# Patient Record
Sex: MALE | Race: WHITE | NOT HISPANIC OR LATINO | Employment: FULL TIME | URBAN - METROPOLITAN AREA
[De-identification: names, ages, dates, MRNs, and addresses within clinical notes are randomized per-mention and may not be internally consistent; named-entity substitution may affect disease eponyms.]

---

## 2017-01-18 ENCOUNTER — GENERIC CONVERSION - ENCOUNTER (OUTPATIENT)
Dept: OTHER | Facility: OTHER | Age: 62
End: 2017-01-18

## 2017-01-26 ENCOUNTER — GENERIC CONVERSION - ENCOUNTER (OUTPATIENT)
Dept: OTHER | Facility: OTHER | Age: 62
End: 2017-01-26

## 2017-02-14 ENCOUNTER — GENERIC CONVERSION - ENCOUNTER (OUTPATIENT)
Dept: OTHER | Facility: OTHER | Age: 62
End: 2017-02-14

## 2017-02-16 ENCOUNTER — GENERIC CONVERSION - ENCOUNTER (OUTPATIENT)
Dept: OTHER | Facility: OTHER | Age: 62
End: 2017-02-16

## 2017-02-20 ENCOUNTER — GENERIC CONVERSION - ENCOUNTER (OUTPATIENT)
Dept: OTHER | Facility: OTHER | Age: 62
End: 2017-02-20

## 2017-02-21 LAB
BUN SERPL-MCNC: 15 MG/DL (ref 8–27)
BUN/CREA RATIO (HISTORICAL): 11 (ref 10–22)
CALCIUM SERPL-MCNC: 9.7 MG/DL (ref 8.6–10.2)
CHLORIDE SERPL-SCNC: 94 MMOL/L (ref 96–106)
CO2 SERPL-SCNC: 25 MMOL/L (ref 18–29)
CREAT SERPL-MCNC: 1.4 MG/DL (ref 0.76–1.27)
EGFR AFRICAN AMERICAN (HISTORICAL): 62 ML/MIN/1.73
EGFR-AMERICAN CALC (HISTORICAL): 53 ML/MIN/1.73
GLUCOSE SERPL-MCNC: 233 MG/DL (ref 65–99)
POTASSIUM SERPL-SCNC: 5.1 MMOL/L (ref 3.5–5.2)
SODIUM SERPL-SCNC: 139 MMOL/L (ref 134–144)

## 2017-02-22 ENCOUNTER — GENERIC CONVERSION - ENCOUNTER (OUTPATIENT)
Dept: OTHER | Facility: OTHER | Age: 62
End: 2017-02-22

## 2017-02-24 ENCOUNTER — GENERIC CONVERSION - ENCOUNTER (OUTPATIENT)
Dept: OTHER | Facility: OTHER | Age: 62
End: 2017-02-24

## 2017-03-01 ENCOUNTER — GENERIC CONVERSION - ENCOUNTER (OUTPATIENT)
Dept: OTHER | Facility: OTHER | Age: 62
End: 2017-03-01

## 2017-03-09 ENCOUNTER — GENERIC CONVERSION - ENCOUNTER (OUTPATIENT)
Dept: OTHER | Facility: OTHER | Age: 62
End: 2017-03-09

## 2017-03-15 ENCOUNTER — GENERIC CONVERSION - ENCOUNTER (OUTPATIENT)
Dept: OTHER | Facility: OTHER | Age: 62
End: 2017-03-15

## 2017-03-16 ENCOUNTER — ALLSCRIPTS OFFICE VISIT (OUTPATIENT)
Dept: OTHER | Facility: OTHER | Age: 62
End: 2017-03-16

## 2017-04-10 ENCOUNTER — ALLSCRIPTS OFFICE VISIT (OUTPATIENT)
Dept: OTHER | Facility: OTHER | Age: 62
End: 2017-04-10

## 2017-04-11 ENCOUNTER — GENERIC CONVERSION - ENCOUNTER (OUTPATIENT)
Dept: OTHER | Facility: OTHER | Age: 62
End: 2017-04-11

## 2017-04-11 LAB — HBA1C MFR BLD HPLC: 8.8 %

## 2017-04-12 ENCOUNTER — GENERIC CONVERSION - ENCOUNTER (OUTPATIENT)
Dept: OTHER | Facility: OTHER | Age: 62
End: 2017-04-12

## 2017-08-22 ENCOUNTER — ALLSCRIPTS OFFICE VISIT (OUTPATIENT)
Dept: OTHER | Facility: OTHER | Age: 62
End: 2017-08-22

## 2017-08-23 ENCOUNTER — GENERIC CONVERSION - ENCOUNTER (OUTPATIENT)
Dept: OTHER | Facility: OTHER | Age: 62
End: 2017-08-23

## 2017-08-25 ENCOUNTER — GENERIC CONVERSION - ENCOUNTER (OUTPATIENT)
Dept: OTHER | Facility: OTHER | Age: 62
End: 2017-08-25

## 2017-09-21 ENCOUNTER — GENERIC CONVERSION - ENCOUNTER (OUTPATIENT)
Dept: OTHER | Facility: OTHER | Age: 62
End: 2017-09-21

## 2017-11-20 ENCOUNTER — ALLSCRIPTS OFFICE VISIT (OUTPATIENT)
Dept: OTHER | Facility: OTHER | Age: 62
End: 2017-11-20

## 2017-11-21 NOTE — PROGRESS NOTES
Assessment    1  Controlled diabetes mellitus type II without complication (891 27) (K08 6)  2  Malignant neoplasm of esophagus (150 9) (C15 9)  3  PEG tube malfunction (536 42) (K94 23)    Plan  PEG tube malfunction    · Start: Cephalexin 500 MG Oral Tablet (Cephalexin Monohydrate); TAKE 1 TABLET 3TIMES DAILY    Discussion/Summary    Will disc w/ GI1        1 Amended By: Laura Adler; Nov 20 2017 1:51 PM EST    Chief Complaint  discuss removal of feeding tube      History of Present Illness  HPI: pt not using PEG---wants to remove      Review of Systems   Constitutional: no fever or chills, feels well, no tiredness, no recent weight loss or weight gain  Cardiovascular: no complaints of slow or fast heart rate, no chest pain, no palpitations, no leg claudication or lower extremity edema  Respiratory: no complaints of shortness of breath, no wheezing or cough, no dyspnea on exertion, no orthopnea or PND  Gastrointestinal: as noted in HPI  Active Problems  1  Abdominal pain (789 00) (R10 9)  2  Ac cerebral infarction w/ ischemia (434 91) (I63 8)  3  Back pain (724 5) (M54 9)  4  Controlled diabetes mellitus type II without complication (186 21) (N51 9)  5  Encounter for PPD test (V74 1) (Z11 1)  6  Erectile dysfunction (607 84) (N52 9)  7  Herniated nucleus pulposus, L5-S1 (722 10) (M51 27)  8  Hyperlipidemia (272 4) (E78 5)  9  Malignant neoplasm of esophagus (150 9) (C15 9)  10  Muscle spasm (728 85) (M62 838)  11  Need for zoster vaccine (V04 89) (Z23)  12  PEG tube malfunction (536 42) (K94 23)  13  S/P spinal fusion (V45 4) (Z98 1)  14  Screening for colorectal cancer (V76 51) (Z12 11,Z12 12)  15  Sepsis (038 9,995 91) (A41 9)  16  Transient cerebral ischemic attack (435 9) (G45 9)    Past Medical History  1  Community acquired pneumonia (5) (J18 9)  2  History of hypertension (V12 59) (Z86 79)  Active Problems And Past Medical History Reviewed:    The active problems and past medical history were reviewed and updated today  Family History  Mother   1  Family history of Mother  At Age 80  Father   2  Family history of Father  At Age 80  Paternal Grandmother   3  Family history of Paternal Grandfather  At Age 66  3  Family history of Paternal [de-identified]  At Age 68  Maternal Grandfather   5  Family history of Paternal Grandfather  At Age 80  6  Family history of Paternal [de-identified]  At Age 80  Family History   7  Denied: Family history of substance abuse  8  No family history of mental disorder    Social History     · Being A Social Drinker   · Caffeine Use   · Current Every Day Smoker (305 1)   · Marital History - Currently     Current Meds  1  AmLODIPine Besylate 5 MG Oral Tablet; TAKE 1 TABLET DAILY  Requested for: 76Kfl7490; Last Rx:49Agp1988 Ordered  2  Aspirin 325 MG Oral Tablet; TAKE 1 TABLET TWICE DAILY; Therapy: (Recorded:07Fdq7030) to Recorded  3  Caverject 40 MCG Intracavernosal Solution Reconstituted; Therapy: 84QVC4847 to Recorded  4  Colace 100 MG Oral Capsule; Therapy: 91CCJ3369 to Recorded  5  Lantus 100 UNIT/ML Subcutaneous Solution; inject 55  units daily; Therapy: 40DDZ0251 to (Last Rx:58Xej3876)  Requested for: 70Flv5170 Ordered  6  Metamucil MultiHealth Fiber 63 % Oral Powder; Therapy: 68LMA6544 to Recorded  7  MetFORMIN HCl - 1000 MG Oral Tablet; TAKE 1 TABLET TWICE DAILY  Requested for: 70Jll7424; Last Rx:30Kdk9873 Ordered  8  MetFORMIN HCl - 500 MG Oral Tablet; TAKE TWO TABLETS (1,000mg total) TWICE DAILY; Therapy: 55SFV2067 to (AEZOGB:09ERO3555)  Requested for: 2017; Last Rx:2017; Status: ACTIVE - Retrospective By Protocol Authorization Ordered  9  Simvastatin 40 MG Oral Tablet; take one tablet by mouth one time daily  Requested for: 34Sbl5811; Last Rx:01Mhf4354 Ordered    Allergies  1   No Known Drug Allergies    Vitals   Recorded: 2017 12:56PM   Heart Rate 88   Systolic 341   Diastolic 90   Height 5 ft    Weight 162 lb BMI Calculated 31 64   BSA Calculated 1 71   O2 Saturation 98       Physical Exam   Constitutional  General appearance: No acute distress, well appearing and well nourished  Pulmonary  Auscultation of lungs: Clear to auscultation, equal breath sounds bilaterally, no wheezes, no rales, no rhonci  Cardiovascular  Auscultation of heart: Normal rate and rhythm, normal S1 and S2, without murmurs  Abdomen  Abdomen: Non-tender, no masses  -- PEG in place          Signatures   Electronically signed by : Dwayne Howard MD; Nov 20 2017  1:51PM EST                       (Author)

## 2017-11-22 ENCOUNTER — GENERIC CONVERSION - ENCOUNTER (OUTPATIENT)
Dept: OTHER | Facility: OTHER | Age: 62
End: 2017-11-22

## 2017-12-07 ENCOUNTER — ALLSCRIPTS OFFICE VISIT (OUTPATIENT)
Dept: OTHER | Facility: OTHER | Age: 62
End: 2017-12-07

## 2017-12-23 ENCOUNTER — GENERIC CONVERSION - ENCOUNTER (OUTPATIENT)
Dept: FAMILY MEDICINE CLINIC | Facility: CLINIC | Age: 62
End: 2017-12-23

## 2017-12-23 ENCOUNTER — GENERIC CONVERSION - ENCOUNTER (OUTPATIENT)
Dept: OTHER | Facility: OTHER | Age: 62
End: 2017-12-23

## 2017-12-24 ENCOUNTER — GENERIC CONVERSION - ENCOUNTER (OUTPATIENT)
Dept: FAMILY MEDICINE CLINIC | Facility: CLINIC | Age: 62
End: 2017-12-24

## 2018-01-09 NOTE — PROCEDURES
Procedures by Lis Howe MD at  6/30/2016  1:32 PM      Author:  Lis Howe MD Service:  Cardiology Author Type:  Physician     Filed:  6/30/2016  1:41 PM Date of Service:  6/30/2016  1:32 PM Status:  Signed     :  Lis Howe MD (Physician)              Procedure Note:    At the request of infectious disease and internal medicine, we were asked to perform a transesophageal echocardiogram   The patient was brought down to the [facility] and assessed by anesthesia  I then explained the risks and benefits of the procedure and what  we were looking for  I explained to him that the benefit of this procedure would determine the duration and perhaps type of antibiotics  The patient was reluctant to go through with the procedure and was apprehensive  At that point he proceeded to  explain how he felt that most professions were filled with 90% stupidity and that he wanted me to convince him that I was part of the 10% that wasn't  I explained to him only the nature of the procedure, and at that point he did not wish to go through  with it  Procedure was canceled  Valery MARINO    Jun 30 2016  1:40PM [facility] Standard Time

## 2018-01-12 NOTE — PROCEDURES
Procedures by Yudelka Layne RN at 7/1/2016   3:45 PM      Author:  Yudelka Layne RN Service:  Outpatient Author Type:  Registered Nurse     Filed:  7/1/2016  4:22 PM Date of Service:  7/1/2016  3:45 PM Status:  Signed     :  Yudelka Layne RN (Registered Nurse)         Procedure Orders:       1  Insert PICC line [id number] ordered by Pamela Bolaños MD at 07/01/16 1344                 Post-procedure Diagnoses:       1  Acute right-sided low back pain without sciatica [M54 5]                   Insert PICC line  Date/Time: 7/1/2016 4:12 PM  Performed by: Xavier Mary  Authorized by: Delma Ellsworth     Patient location:  Bedside  Other Assisting Provider:  No    Consent:     Consent obtained:  Verbal and written (Obtained by Dr Adarsh Ruiz)    Consent given by:  Patient and healthcare agent (Son)  Chapin protocol:     Procedure explained and questions answered to patient or proxy's satisfaction: yes      Relevant documents present and verified: yes      Test results available and properly labeled: yes       Imaging studies available: yes      Required blood products, implants, devices, and special equipment available: yes      Site/side marked: yes      Immediately prior to procedure, a time out was called: yes  Joao Mello RN @1566)      Patient identity confirmed:  Verbally with patient, arm band and hospital-assigned identification number  Pre-procedure details:     Hand hygiene: Hand hygiene performed prior to insertion      Sterile barrier technique: All elements of maximal sterile technique followed      Skin preparation:  ChloraPrep    Skin preparation agent: Skin preparation agent completely dried prior to procedure    Indications:     PICC line indications: vascular access    Anesthesia (see MAR for exact dosages):      Anesthesia method:  Local infiltration    Local anesthetic:  Lidocaine 1% w/o epi (1 ml)  Procedure details:     Location:  Basilic    Vessel type: vein      Laterality:  Right Approach: percutaneous technique used      Patient position:  Flat    Procedural supplies:  Double lumen    Catheter size:  5 Fr    Landmarks identified: yes      Ultrasound guidance: yes (Cale Sapiens 3CG)      Sterile ultrasound techniques: Sterile gel and sterile probe covers were used      Number of attempts:  1    Successful placement: yes      Vessel of catheter tip end:  CAJ as confirmed by Cale Sapiens 3CG US    Total catheter length (cm):  43    Catheter out on skin (cm):  0    Max flow rate:  999 ml/hr    Arm circumference:  32 5  Post-procedure details:     Post-procedure:  Dressing applied and securement device placed    Assessment:  Blood return through all ports and free fluid flow    Post-procedure complications: none      Patient tolerance of procedure: Tolerated well, no immediate complications  Comments:      Uncomplicated PICC insertion, no complaints offered                       Received for:Provider  EPIC   Jul 1 2016  4:22PM [facility] Standard Time

## 2018-01-12 NOTE — MISCELLANEOUS
Assessment    1  S/P spinal fusion (V45 4) (Z98 1)    Plan  S/P spinal fusion    · Follow-up PRN Evaluation and Treatment  Follow-up  Status: Complete  Done:  75KAU1375 10:50AM   Ordered; For: S/P spinal fusion; Ordered By: Jarrett Anthony Performed:  Due: 63Qzc6326    History of Present Illness  TCM Communication [facility]: The patient is being contacted for follow-up after hospitalization  [facility]  The date of admission: 2016, date of discharge: 2016  Diagnosis: SPINAL FUSION  He was discharged to home  Medications reviewed and updated today  Follow-up appointments with other specialists: Freida Weir  The patient is currently asymptomatic  Counseling was provided to the patient  Communication performed and completed by      Review of Systems  Complete-Male:   Constitutional: No fever or chills, feels well, no tiredness, no recent weight gain or weight loss  Musculoskeletal: as noted in HPI  Active Problems    1  Ac cerebral infarction w/ ischemia (434 91) (I63 8)   2  Back pain (724 5) (M54 9)   3  Diabetes Mellitus   4  Erectile dysfunction (607 84) (N52 9)   5  Herniated nucleus pulposus, L5-S1 (722 10) (M51 27)   6  Hyperlipidemia (272 4) (E78 5)   7  Muscle spasm (728 85) (M62 838)   8  Need for zoster vaccine (V04 89) (Z23)   9  Sepsis (038 9,995 91) (A41 9)   10  Transient cerebral ischemic attack (435 9) (G45 9)    Past Medical History    1  Community acquired pneumonia (5) (J18 9)   2  History of hypertension (V12 59) (Z86 79)    Family History  Mother    1  Family history of Mother  At Age 80  Father    2  Family history of Father  At Age 80  Paternal Grandmother    3  Family history of Paternal Grandfather  At Age 67   4  Family history of Paternal [de-identified]  At Age 68  Maternal Grandfather    5  Family history of Paternal Grandfather  At Age 80   7   Family history of Paternal [de-identified]  At Age 80    Social History    · Being A Social Drinker   · Caffeine Use   · Current Every Day Smoker (305 1)   · Marital History - Currently     Current Meds   1  Aspirin TABS; Therapy: (Recorded:13Jly5626) to Recorded   2  Edex 40 MCG Intracavernosal Kit; USE AS DIRECTED; Therapy: 48THO6753 to (Last Rx:76Qgg9881)  Requested for: 79Peq6071 Ordered   3  Lantus 100 UNIT/ML Subcutaneous Solution; INJECT 70 UNIT Daily; Therapy: 87ZMH7490 to (Last Rx:62Yim5478)  Requested for: 18Dvj2463 Ordered   4  MetFORMIN HCl - 500 MG Oral Tablet; TAKE 2 TABLETS TWICE DAILY  Requested for:   12Wwa0181; Last Rx:63Izp0977 Ordered   5  Norvasc 5 MG Oral Tablet; Therapy: (Recorded:46Lma8051) to Recorded   6  Simvastatin 40 MG Oral Tablet; take one tablet by mouth one time daily  Requested for:   90Sxo9086; Last Rx:90Lfa5563 Ordered    Allergies    1  No Known Drug Allergies    Physical Exam    Constitutional   General appearance: No acute distress, well appearing and well nourished  Pulmonary   Auscultation of lungs: Clear to auscultation, equal breath sounds bilaterally, no wheezes, no rales, no rhonci  Cardiovascular   Auscultation of heart: Normal rate and rhythm, normal S1 and S2, without murmurs  Musculoskeletal   Inspection/palpation of joints, bones, and muscles: Abnormal   spinal surg healing well          Signatures   Electronically signed by : Francisca Becerra MD; Aug 25 2016 10:47AM EST                       (Author)

## 2018-01-13 VITALS
BODY MASS INDEX: 23.64 KG/M2 | HEIGHT: 68 IN | DIASTOLIC BLOOD PRESSURE: 110 MMHG | OXYGEN SATURATION: 98 % | SYSTOLIC BLOOD PRESSURE: 170 MMHG | HEART RATE: 86 BPM | RESPIRATION RATE: 16 BRPM | WEIGHT: 156 LBS

## 2018-01-13 VITALS
HEART RATE: 86 BPM | BODY MASS INDEX: 20.31 KG/M2 | WEIGHT: 134 LBS | SYSTOLIC BLOOD PRESSURE: 142 MMHG | DIASTOLIC BLOOD PRESSURE: 82 MMHG | OXYGEN SATURATION: 96 % | HEIGHT: 68 IN

## 2018-01-13 VITALS
WEIGHT: 162 LBS | DIASTOLIC BLOOD PRESSURE: 90 MMHG | BODY MASS INDEX: 31.8 KG/M2 | HEART RATE: 88 BPM | SYSTOLIC BLOOD PRESSURE: 162 MMHG | HEIGHT: 60 IN | OXYGEN SATURATION: 98 %

## 2018-01-16 NOTE — RESULT NOTES
Verified Results  [facility]) BMP8+eGFR 73MJV1354 09:46AM Judge Kimball     Test Name Result Flag Reference   Glucose, Serum 233 mg/dL H 65-99   BUN 15 mg/dL  8-27   Creatinine, Serum 1 40 mg/dL H 0 76-1 27   eGFR If NonAfricn Am 53 mL/min/1 73 L >59   eGFR If Africn Am 62 mL/min/1 73  >59   BUN/Creatinine Ratio 11  10-22   Sodium, Serum 139 mmol/L  134-144   Potassium, Serum 5 1 mmol/L  3 5-5 2   Chloride, Serum 94 mmol/L L    Carbon Dioxide, Total 25 mmol/L  18-29   Calcium, Serum 9 7 mg/dL  8 6-10 2

## 2018-01-23 VITALS
WEIGHT: 174 LBS | OXYGEN SATURATION: 98 % | DIASTOLIC BLOOD PRESSURE: 90 MMHG | SYSTOLIC BLOOD PRESSURE: 152 MMHG | BODY MASS INDEX: 29.71 KG/M2 | HEIGHT: 64 IN | HEART RATE: 84 BPM

## 2018-01-23 NOTE — MISCELLANEOUS
Assessment    1  Abdominal pain (789 00) (R10 9)   2  Controlled diabetes mellitus type II without complication (838 82) (Z70 1)   3  Malignant neoplasm of esophagus (150 9) (C15 9)    Plan  Abdominal pain    · Morphine Sulfate ER 15 MG Oral Tablet Extended Release; Take 1 tablet 3-4x daily   Rx By: Anna Tejada; Dispense: 0 Days ; #:30 Tablet Extended Release; Refill: 0; For: Abdominal pain; BEN = N; Print Rx   · TraMADol HCl - 50 MG Oral Tablet; TAKE 1 TO 2 TABLETS BY MOUTH EVERY 6 TO  8 HOURS   Rx By: Anna Tejada; Dispense: 8 Days ; #:30 Tablet; Refill: 5; For: Abdominal pain; BEN = N; Print Rx    Chief Complaint  Chief Complaint Free Text Note Form: maryse--hospital stay      History of Present Illness  TCM Communication [facility]: The patient is being contacted for follow-up after hospitalization  He was hospitalized at [facility]  The date of admission: 12/3/17, date of discharge: 12/5/17  Diagnosis: abd pain  Zain Po He was discharged to home  Medications were not reviewed today  Follow-up appointments with other specialists: pt will call to schedule  The patient is currently asymptomatic  Communication performed and completed by itz & pt @ office visit      Review of Systems  Complete-Male:   Constitutional: No fever or chills, feels well, no tiredness, no recent weight gain or weight loss  Cardiovascular: No complaints of slow heart rate, no fast heart rate, no chest pain, no palpitations, no leg claudication, no lower extremity  Respiratory: No complaints of shortness of breath, no wheezing, no cough, no SOB on exertion, no orthopnea or PND  Gastrointestinal: abdominal pain and nausea, but no blood in stools  Genitourinary: No complaints of dysuria, no incontinence, no hesitancy, no nocturia, no genital lesion, no testicular pain  Active Problems    1  Abdominal pain (789 00) (R10 9)   2  Ac cerebral infarction w/ ischemia (434 91) (I63 8)   3  Back pain (724 5) (M54 9)   4   Controlled diabetes mellitus type II without complication (856 45) (D22 6)   5  Encounter for PPD test (V74 1) (Z11 1)   6  Erectile dysfunction (607 84) (N52 9)   7  Herniated nucleus pulposus, L5-S1 (722 10) (M51 27)   8  Hyperlipidemia (272 4) (E78 5)   9  Malignant neoplasm of esophagus (150 9) (C15 9)   10  Muscle spasm (728 85) (M62 838)   11  Need for zoster vaccine (V04 89) (Z23)   12  PEG tube malfunction (536 42) (K94 23)   13  S/P spinal fusion (V45 4) (Z98 1)   14  Screening for colorectal cancer (V76 51) (Z12 11,Z12 12)   15  Sepsis (038 9,995 91) (A41 9)   16  Transient cerebral ischemic attack (435 9) (G45 9)    Past Medical History    1  Community acquired pneumonia (5) (J18 9)   2  History of hypertension (V12 59) (Z86 79)    Family History  Mother    1  Family history of Mother  At Age 80  Father    2  Family history of Father  At Age 80  Paternal Grandmother    3  Family history of Paternal Grandfather  At Age 67   4  Family history of Paternal [de-identified]  At Age 68  Maternal Grandfather    5  Family history of Paternal Grandfather  At Age 80   7  Family history of Paternal [de-identified]  At Age 80  Family History    7  Denied: Family history of substance abuse   8  No family history of mental disorder    Social History    · Being A Social Drinker   · Caffeine Use   · Current Every Day Smoker (305 1)   · Marital History - Currently     Current Meds   1  AmLODIPine Besylate 5 MG Oral Tablet; TAKE 1 TABLET DAILY  Requested for:   53Yhl5306; Last Rx:89Qds3463 Ordered   2  Aspirin 325 MG Oral Tablet; TAKE 1 TABLET TWICE DAILY; Therapy: (Recorded:34Uez9013) to Recorded   3  Caverject 40 MCG Intracavernosal Solution Reconstituted; Therapy: 34BZG3177 to Recorded   4  Cephalexin 500 MG Oral Tablet; TAKE 1 TABLET 3 TIMES DAILY; Therapy: 48FID8759 to (Evaluate:2017)  Requested for: 58KBA4113; Last   Rx:2017 Ordered   5  Colace 100 MG Oral Capsule;    Therapy: 06Bse4567 to Recorded   6  Lantus 100 UNIT/ML Subcutaneous Solution; inject 55  units daily; Therapy: 80PYY0980 to (Last Rx:64Zyi6718)  Requested for: 31Nqm3931 Ordered   7  Metamucil MultiHealth Fiber 63 % Oral Powder; Therapy: 99TTB3578 to Recorded   8  MetFORMIN HCl - 1000 MG Oral Tablet; TAKE 1 TABLET TWICE DAILY  Requested for:   44Jbl6822; Last Rx:96Kfb5239 Ordered   9  MetFORMIN HCl - 500 MG Oral Tablet; TAKE TWO TABLETS (1,000mg total) TWICE   DAILY; Therapy: 90SLV6773 to (RYIQVQVL:02VIE4426)  Requested for: 23Oct2017; Last   Rx:23Oct2017; Status: ACTIVE - Retrospective By Protocol Authorization Ordered   10  Simvastatin 40 MG Oral Tablet; take one tablet by mouth one time daily  Requested for:    73Mly1683; Last Rx:90Btq9281 Ordered    Allergies    1  No Known Drug Allergies    Vitals  Signs   Recorded: 07Dec2017 10:54AM   Heart Rate: 84  Systolic: 584  Diastolic: 90  Height: 5 ft 4 in  Weight: 174 lb   BMI Calculated: 29 87  BSA Calculated: 1 84  O2 Saturation: 98    Physical Exam    Constitutional   General appearance: No acute distress, well appearing and well nourished  Cardiovascular   Auscultation of heart: Normal rate and rhythm, normal S1 and S2, without murmurs  Abdomen   Abdomen: Non-tender, no masses  Liver and spleen: No hepatomegaly or splenomegaly           Signatures   Electronically signed by : Ashley Monzon MD; Dec  7 2017 11:40AM EST                       (Author)

## 2018-01-24 NOTE — MISCELLANEOUS
Assessment   1  Herniated nucleus pulposus, L5-S1 (722 10) (M51 27)1   2  Sepsis (038 9,995 91) (A41 9)1   3  Community acquired pneumonia (539) (J19 10)1      1 Amended By: Renetta Graves; Jul 15 2016 1:19 PM EST    Plan  Community acquired pneumonia    · Follow-up PRN Evaluation and Treatment  Follow-up  Status: Complete  Done:  74YXN0982 01:21PM1   Ordered; For: Community acquired pneumonia; Ordered By: Renetta Graves    Performed:   Due: 82EKP02630      1 Amended By: Renetta Graves; Jul 15 2016 1:19 PM EST    Discussion/Summary  Discussion Summary:   Pt w/ port and IV ABs--herniated disc--will need surg eval when infectious dis spec clears1        1 Amended By: Renetta Graves; Jul 15 2016 1:24 PM EST    Chief Complaint  Chief Complaint Free Text Note Form: TEQUILA   1        1 Amended By: Allie Martínez; Jul 15 2016 12:45 PM EST    History of Present Illness  TCM Communication [facility]: The patient is being contacted for follow-up after hospitalization and [facility] [place]  Hospital records were reviewed  The date of admission: 6/25/16, date of discharge: 7/5/2016  Diagnosis: PNEMONIA  He was discharged to home  Medications reviewed and updated today  He scheduled a follow up appointment  Follow-up appointments with other specialists: DR LORETTA EVANS  The patient is currently asymptomatic  Counseling was provided to the patient  Communication performed and completed by KEM VALLEJO AND PATIENT      Review of Systems  Complete-Male:   Constitutional:1  No fever or chills, feels well, no tiredness, no recent weight gain or weight loss1   Cardiovascular: 1  No complaints of slow heart rate, no fast heart rate, no chest pain, no palpitations, no leg claudication, no lower extremity1   Respiratory:1  No complaints of shortness of breath, no wheezing, no cough, no SOB on exertion, no orthopnea or PND1   Musculoskeletal:1  as noted in Luz Choudhury          1 Amended By: Renetta Graves; Jul 15 2016 1:14 PM EST    Active Problems 1  Ac cerebral infarction w/ ischemia (434 91) (I63 8)  2  Erectile dysfunction (607 84) (N52 9)  3  Muscle spasm (728 85) (M62 838)  4  Need for zoster vaccine (V04 89) (Z23)  5  Transient cerebral ischemic attack (435 9) (G45 9)    Past Medical History   1  History of Diabetes Mellitus (250 00)  2  History of hyperlipidemia (V12 29) (Z86 39)  3  History of hypertension (V12 59) (Z86 79)    Family History  Mother   1  Family history of Mother  At Age 80  Father   2  Family history of Father  At Age 80  Paternal Grandmother   3  Family history of Paternal Grandfather  At Age 66  3  Family history of Paternal [de-identified]  At Age 68  Maternal Grandfather   5  Family history of Paternal Grandfather  At Age 80  6  Family history of Paternal [de-identified]  At Age 80  Family History Reviewed: The family history was reviewed and updated today  1        1 Amended By: Roland Torrez; Jul 15 2016 1:14 PM EST    Social History    · Being A Social Drinker   · Caffeine Use   · Current Every Day Smoker (305 1)   · Marital History - Currently     Social History Reviewed: The social history was reviewed and updated today  1        1 Amended By: Roland Torrez; Jul 15 2016 1:14 PM EST    Current Meds  1  Aspirin TABS; Therapy: (Recorded:74Fbg8349) to Recorded  2  Baclofen 10 MG Oral Tablet; TAKE 1 TABLET 4 times a day as needed for muscle   spasams; Therapy: 98YRK7227 to (Last Rx:12Nll2251)  Requested for: 73FTP6248; Status: ACTIVE -   Retrospective By Protocol Authorization Ordered  3  Edex 40 MCG Intracavernosal Kit; USE AS DIRECTED; Therapy: 38YZK5258 to (Last Rx:2016) Ordered  4  GlipiZIDE 5 MG Oral Tablet; Therapy: 54QBL7123 to Recorded  5  Lantus SOLN;   Therapy: (Recorded:79Zlg7213) to Recorded  6  MetFORMIN HCl - 500 MG Oral Tablet; Therapy: (Recorded:65Orl5020) to Recorded  7  Norvasc 5 MG Oral Tablet (AmLODIPine Besylate); Therapy: (Recorded:57Jse1658) to Recorded  8  Simvastatin 40 MG Oral Tablet; Therapy: (Recorded:46Sao5253) to Recorded    Allergies   1  No Known Drug Allergies    Physical Exam    Constitutional1    General appearance: No acute distress, well appearing and well nourished  1    Pulmonary1    Auscultation of lungs: Clear to auscultation, equal breath sounds bilaterally, no wheezes, no rales, no rhonci  1    Cardiovascular1    Auscultation of heart: Normal rate and rhythm, normal S1 and S2, without murmurs  1    Abdomen1    Abdomen: Non-tender, no masses  1          1 Amended By: Artemio Hill; Jul 15 2016 1:15 PM EST    Message   Recorded as Task   Date: 07/05/2016 05:39 PM, Created By: System   Task Name: Jourdan Olguin   Assigned To: [address] [place] [address]   Regarding Patient: Noelle Agosto, Status: Active   Comment:   System - 05 Jul 2016 5:39 PM    Patient discharged from hospital   Patient Name: Mora Mathis  Patient YOB: 1955  Discharge Date: 7/5/2016  Facility: [facility] - 06 Jul 2016 7:10 AM    Gladis Pinedas - 06 Jul 2016 7:10 AM    TASK REASSIGNED: Previously Assigned To [employer]   Electronically signed by : Mushtaq Mendez MD; Jul 15 2016 12:07PM EST                          Electronically signed by : Mushtaq Mendez MD; Jul 15 2016  1:24PM EST                       (Author)